# Patient Record
Sex: MALE | Race: WHITE | NOT HISPANIC OR LATINO | Employment: FULL TIME | URBAN - METROPOLITAN AREA
[De-identification: names, ages, dates, MRNs, and addresses within clinical notes are randomized per-mention and may not be internally consistent; named-entity substitution may affect disease eponyms.]

---

## 2020-09-18 ENCOUNTER — TELEPHONE (OUTPATIENT)
Dept: NEUROLOGY | Facility: HOSPITAL | Age: 65
End: 2020-09-18

## 2020-09-21 ENCOUNTER — TELEPHONE (OUTPATIENT)
Dept: NEUROLOGY | Facility: HOSPITAL | Age: 65
End: 2020-09-21

## 2020-09-21 NOTE — TELEPHONE ENCOUNTER
Audio visit scheduled with pt on Wednesday, September 23, 2020 at 215pm.  Pt repeated date and time correctly.

## 2020-09-21 NOTE — TELEPHONE ENCOUNTER
----- Message from Giovana Romo sent at 9/21/2020  9:19 AM CDT -----  Contact: self [phone]  GI - Patient is returning your call. Please call

## 2020-09-23 ENCOUNTER — OFFICE VISIT (OUTPATIENT)
Dept: NEUROLOGY | Facility: HOSPITAL | Age: 65
End: 2020-09-23
Attending: INTERNAL MEDICINE
Payer: COMMERCIAL

## 2020-09-23 DIAGNOSIS — Z18.9 RETAINED FOREIGN BODY: Primary | ICD-10-CM

## 2020-09-23 RX ORDER — SODIUM, POTASSIUM,MAG SULFATES 17.5-3.13G
1 SOLUTION, RECONSTITUTED, ORAL ORAL ONCE
Qty: 1 BOTTLE | Refills: 0 | Status: SHIPPED | OUTPATIENT
Start: 2020-09-23 | End: 2020-09-23

## 2020-09-23 NOTE — PROGRESS NOTES
Established Patient - Audio Only Telehealth Visit     The patient location is: home  The chief complaint leading to consultation is: anemia  Visit type: Virtual visit with audio only (telephone)  Total time spent with patient: 30min    The reason for the audio only service rather than synchronous audio and video virtual visit was related to technical difficulties or patient preference/necessity.     Each patient to whom I provide medical services by telemedicine is:  (1) informed of the relationship between the physician and patient and the respective role of any other health care provider with respect to management of the patient; and (2) notified that they may decline to receive medical services by telemedicine and may withdraw from such care at any time. Patient verbally consented to receive this service via voice-only telephone call.    [facility]    CC: anemia     HPI 65 y.o. male evaluated via telemedicine for new, severe, worsening, iron deficiency anemia who also has a retained Capsocam in the terminal ileum on CT. He is a pt of Dr. Shipley. He developed new anemia in the last year that has progressively worsened, requiring hospitalization this summer due to Hgb 7, was transfused then for the first time in his life. For unclear reasons he had a VCE 3yrs ago which showed an ileal ulcer, he was told to stop NSAIDs which he did, however he notes new worsening anemia in the last year. Had EGD/colonoscopy which were unremarkable so underwent Capsocam in early July. He did not pass the capsule on serial imaging, and had a CT last week that showed retained device in terminal ileum. He has mild worsening post-prandial abdominal pain without nausea or vomiting. No change in bowel habits, he takes Colace daily to have a soft brown BM, no diarrhea, hematochezia. No fevers, chills, weight loss, dysphagia, odynophagia, rectal pain. No oral ulcers, eye problems, joint pain or skin problems. He does chronically  use NSAIDs. No nosebleeds, abdominal XRT, or blood thinners.    Chart reviewed and summarized here.    Past Medical History MARYBEL, HTN, HLD    Review of Systems  General ROS: negative for chills, fever or weight loss  Cardiovascular ROS: no chest pain or dyspnea on exertion  Gastrointestinal ROS: +abdominal pain, +constipation, no black/ bloody stools    Physical Examination limited 2/2 telemedicine however pt alert, cooperative, in no distress, no slurred speech, no audible wheezing or cough, no tangential thoughts    Labs: personally reviewed Hgb 7, ferritin 12    Imaging: CT A/P with retained capsule terminal ileum    Previous medical records reviewed     Assessment:   64yo M with new MARYBEL who has Capsocam retention in the terminal ileum. Reportedly had ileal ulceration on Capsocam 3 years ago, was told to stop chronic NSAIDs however has started up using them again. Ddx stricture from ileal Crohn's vs NSAID enteropathy vs malignancy.    Plan:  - Lower DBE for capsule retrieval and evaluation of suspected ileal stenosis, please give device back to pt so it can be mailed for review of proximal small bowel  - Suprep sent to [facility] in [place]    Pt of Dr Quinten Sharpe MD   [address] [place] [address], [address]   [place] [address] [phone]     This service was not originating from a related E/M service provided within the previous 7 days nor will  to an E/M service or procedure within the next 24 hours or my soonest available appointment.  Prevailing standard of care was able to be met in this audio-only visit.

## 2020-09-24 ENCOUNTER — TELEPHONE (OUTPATIENT)
Dept: NEUROLOGY | Facility: HOSPITAL | Age: 65
End: 2020-09-24

## 2020-09-24 NOTE — TELEPHONE ENCOUNTER
Suprep instructions with pt and mailed to residence.  Confirmed pt's address. Pt informed Endoscopy staff will contact 2-3 days to confirm arrival time. Pt informed covid 19 test to be given the morning of procedure. iSUPREP Instructions     [facility] [address]  Clinic Office [phone]  Endoscopy Lab [phone]     You are scheduled for a Lower DBE with  on Thursday, November 5, 2020 at [facility].  You will check in at the Information desk on the first floor of the hospital. Please contact the office to reschedule if needed.     · A responsible adult (family member or friend) must come with you and transport you home.  You are not allowed to drive, take a taxi/ride share or bus or leave the Endoscopy Center alone.  If you do not have a responsible adult with you to take you home, your exam will be cancelled.      · Please follow instructions of  if you are taking anticoagulant/blood thinning medications such as Aggrenox, Brilinta, Effient, Eliquis, Lovenox, Plavix, Pletal, Pradaxa, Ticilid, Xarelto or Coumadin.      · Please skip your morning dose of insulin or other oral medications for diabetes the morning of the procedure unless instructed otherwise by your doctor. You should take your blood pressure, heart, anti-rejection and or seizure medication the morning of your procedure.     · To ensure that your test is accurate and complete, you must follow these instructions - please do not use the instructions provided from the pharmacy.        The Day Before The Procedure:     · Follow a CLEAR LIQUID DIET for the entire day before your scheduled colonoscopy.  This means no solid food the entire day.  · A clear liquid diet includes the following:  ? Water, Coffee or decaffeinated coffee (no milk or cream)  ? Tea, Herbal tea  ? Carbonated beverages (soft drinks), regular and sugar free  ? Gelatin  ? Apple Juice, white grape juice, white cranberry  juice  ? Gatorade, Power Aid, Crystal Light, Roland Aid (Yellow, Green, Clear)  ? Lemonade and Limeade  ? Bouillon, clear consomme'  ? Snowball, popsicles  (Yellow, Green, Clear)     DO NOT DRINK ANY LIQUIDS CONTAINING RED DYE  DO NOT DRINK ANY LIQUIDS NOT SPECIFICALLY LISTED  DO NOT DRINK ALCOHOL     · At 5 pm the evening before your colonoscopy:  ? Pour one (1) bottle of SUPREP into the container provided in the box. Add water to the line on the container and mix well.  Drink the whole container and then drink 2 more containers of water over 1 hour.  § This is sometimes easier to drink if this solution is cold, so you can mix the solution 20 minutes ahead of time and place in the refrigerator prior to drinking.  You have to drink the solution within 30-45 minutes of mixing it.  Do NOT put this solution over ice.  It IS ok to drink with a straw.     The Day of the Procedure - The Endoscopy department will call you 2 days prior to your procedure to give you the exact time     · 5 hours prior to your ARRIVAL TIME (this may be in the middle of the night)  ? Pour the 2nd bottle of SUPREP into the container provided in the box.  Add water to the line on the container and mix well.  Drink the whole container and then drink 2 more containers of water over 1 hour.     ? AFTER YOU HAVE COMPLETED YOUR PREP, YOU MAY HAVE NOTHING ELSE BY MOUTH     ? Please leave all valuables and jewelry at home.     ? At 600 am, you may take the last dose of any medications you are allowed to take with a small sip of water (blood pressure, heart, anti-rejection and or seizure medication).  If you use inhalers bring them with you.     ? You may call the Endoscopy department at [phone] with any questions regarding your procedure.

## 2020-09-24 NOTE — PATIENT INSTRUCTIONS
SUPREP Instructions    [facility] [address]  Clinic Office [phone]  Endoscopy Lab [phone]    You are scheduled for a Colonoscopy with  on Thursday, November 5, 2020 at [facility].  You will check in at the Information desk on the first floor of the hospital. Please contact the office to reschedule if needed.     A responsible adult (family member or friend) must come with you and transport you home.  You are not allowed to drive, take a taxi/ride share or bus or leave the Endoscopy Center alone.  If you do not have a responsible adult with you to take you home, your exam will be cancelled.      Please follow instructions of  if you are taking anticoagulant/blood thinning medications such as Aggrenox, Brilinta, Effient, Eliquis, Lovenox, Plavix, Pletal, Pradaxa, Ticilid, Xarelto or Coumadin.      Please skip your morning dose of insulin or other oral medications for diabetes the morning of the procedure unless instructed otherwise by your doctor. You should take your blood pressure, heart, anti-rejection and or seizure medication the morning of your procedure.     To ensure that your test is accurate and complete, you must follow these instructions - please do not use the instructions provided from the pharmacy.      The Day Before The Procedure:     Follow a CLEAR LIQUID DIET for the entire day before your scheduled colonoscopy.  This means no solid food the entire day.   A clear liquid diet includes the following:  o Water, Coffee or decaffeinated coffee (no milk or cream)  o Tea, Herbal tea  o Carbonated beverages (soft drinks), regular and sugar free  o Gelatin  o Apple Juice, white grape juice, white cranberry juice  o Gatorade, Power Aid, Crystal Light, Roland Aid (Yellow, Green, Clear)  o Lemonade and Limeade  o Bouillon, clear consomme'  o Snowball, popsicles  (Yellow, Green, Clear)    DO NOT DRINK ANY LIQUIDS CONTAINING RED DYE  DO  NOT DRINK ANY LIQUIDS NOT SPECIFICALLY LISTED  DO NOT DRINK ALCOHOL     At 5 pm the evening before your colonoscopy:  o Pour one (1) bottle of SUPREP into the container provided in the box. Add water to the line on the container and mix well.  Drink the whole container and then drink 2 more containers of water over 1 hour.  - This is sometimes easier to drink if this solution is cold, so you can mix the solution 20 minutes ahead of time and place in the refrigerator prior to drinking.  You have to drink the solution within 30-45 minutes of mixing it.  Do NOT put this solution over ice.  It IS ok to drink with a straw.    The Day of the Procedure - The Endoscopy department will call you 2 days prior to your procedure to give you the exact time     5 hours prior to your ARRIVAL TIME (this may be in the middle of the night)  o Pour the 2nd bottle of SUPREP into the container provided in the box.  Add water to the line on the container and mix well.  Drink the whole container and then drink 2 more containers of water over 1 hour.    o AFTER YOU HAVE COMPLETED YOUR PREP, YOU MAY HAVE NOTHING ELSE BY MOUTH    o Please leave all valuables and jewelry at home.    o At 600 am, you may take the last dose of any medications you are allowed to take with a small sip of water (blood pressure, heart, anti-rejection and or seizure medication).  If you use inhalers bring them with you.    o You may call the Endoscopy department at [phone] with any questions regarding your procedure.

## 2020-09-24 NOTE — LETTER
Boyd Hackett  1955    Lower DBE scheduled with pt on Thursday, November 5, 2020 at [facility].   You will be contacted 2-3 days prior to procedure by Endoscopy staff to confirm arrival time.  A covid 19 test will be given the morning of procedure.  You will be given anesthesia and will not be able to drive for 12 hours, please bring a .    SUPREP Instructions     [facility]  [address] [facility]  Clinic Office [phone]  Endoscopy Lab [phone]     You are scheduled for a Lower DBE with  on Thursday, November 5, 2020 at [facility].  You will check in at the Information desk on the first floor of the hospital. Please contact the office to reschedule if needed.     · A responsible adult (family member or friend) must come with you and transport you home.  You are not allowed to drive, take a taxi/ride share or bus or leave the Endoscopy Center alone.  If you do not have a responsible adult with you to take you home, your exam will be cancelled.      · Please follow instructions of  if you are taking anticoagulant/blood thinning medications such as Aggrenox, Brilinta, Effient, Eliquis, Lovenox, Plavix, Pletal, Pradaxa, Ticilid, Xarelto or Coumadin.      · Please skip your morning dose of insulin or other oral medications for diabetes the morning of the procedure unless instructed otherwise by your doctor. You should take your blood pressure, heart, anti-rejection and or seizure medication the morning of your procedure.     · To ensure that your test is accurate and complete, you must follow these instructions - please do not use the instructions provided from the pharmacy.        The Day Before The Procedure:     · Follow a CLEAR LIQUID DIET for the entire day before your scheduled colonoscopy.  This means no solid food the entire day.  · A clear liquid diet includes the following:  ? Water, Coffee or decaffeinated coffee (no milk or  cream)  ? Tea, Herbal tea  ? Carbonated beverages (soft drinks), regular and sugar free  ? Gelatin  ? Apple Juice, white grape juice, white cranberry juice  ? Gatorade, Power Aid, Crystal Light, Roland Aid (Yellow, Green, Clear)  ? Lemonade and Limeade  ? Bouillon, clear consomme'  ? Snowball, popsicles  (Yellow, Green, Clear)     DO NOT DRINK ANY LIQUIDS CONTAINING RED DYE  DO NOT DRINK ANY LIQUIDS NOT SPECIFICALLY LISTED  DO NOT DRINK ALCOHOL     · At 5 pm the evening before your colonoscopy:  ? Pour one (1) bottle of SUPREP into the container provided in the box. Add water to the line on the container and mix well.  Drink the whole container and then drink 2 more containers of water over 1 hour.  § This is sometimes easier to drink if this solution is cold, so you can mix the solution 20 minutes ahead of time and place in the refrigerator prior to drinking.  You have to drink the solution within 30-45 minutes of mixing it.  Do NOT put this solution over ice.  It IS ok to drink with a straw.     The Day of the Procedure - The Endoscopy department will call you 2 days prior to your procedure to give you the exact time     · 5 hours prior to your ARRIVAL TIME (this may be in the middle of the night)  ? Pour the 2nd bottle of SUPREP into the container provided in the box.  Add water to the line on the container and mix well.  Drink the whole container and then drink 2 more containers of water over 1 hour.     ? AFTER YOU HAVE COMPLETED YOUR PREP, YOU MAY HAVE NOTHING ELSE BY MOUTH     ? Please leave all valuables and jewelry at home.     ? At 600 am, you may take the last dose of any medications you are allowed to take with a small sip of water (blood pressure, heart, anti-rejection and or seizure medication).  If you use inhalers bring them with you.     ? You may call the Endoscopy department at [phone] with any questions regarding your procedure.

## 2020-10-09 ENCOUNTER — TELEPHONE (OUTPATIENT)
Dept: NEUROLOGY | Facility: HOSPITAL | Age: 65
End: 2020-10-09

## 2020-10-09 NOTE — TELEPHONE ENCOUNTER
Pt requesting information for local hotels for procedure scheduled on 11/2/20. Pt informed local area lodging listing to be emailed.  Pt acknowledged understanding.

## 2020-10-09 NOTE — TELEPHONE ENCOUNTER
----- Message from Odalys Gandara sent at 10/9/2020  9:25 AM CDT -----  Contact: [phone]/Self  GI  Patient is requesting to speak with nurse regarding upcoming procedure. Please advise.

## 2020-11-03 ENCOUNTER — TELEPHONE (OUTPATIENT)
Dept: ENDOSCOPY | Facility: HOSPITAL | Age: 65
End: 2020-11-03

## 2020-11-03 NOTE — TELEPHONE ENCOUNTER
Spoke with patient about arrival time @ 0700.   Covid test = needs rapid    Prep instructions reviewed: the day before the procedure, follow a clear liquid diet all day, then start the first 1/2 of prep at 5pm and take 2nd 1/2 of prep @ 0200.  Pt must be completely NPO when prep completed @ 0400.              Medications: Do not take Insulin or oral diabetic medications the day of the procedure.  Take as prescribed: heart, seizure and blood pressure medication in the morning with a sip of water (less than an ounce).  Take any breathing medications and bring inhalers to hospital with you Leave all valuables and jewelry at home.     Wear comfortable clothes to procedure to change into hospital gown You cannot drive for 24 hours after your procedure because you will receive sedation for your procedure to make you comfortable.  A ride must be provided at discharge.

## 2020-11-05 ENCOUNTER — ANESTHESIA EVENT (OUTPATIENT)
Dept: ENDOSCOPY | Facility: HOSPITAL | Age: 65
End: 2020-11-05
Payer: COMMERCIAL

## 2020-11-05 ENCOUNTER — HOSPITAL ENCOUNTER (OUTPATIENT)
Facility: HOSPITAL | Age: 65
Discharge: HOME | End: 2020-11-05
Attending: INTERNAL MEDICINE | Admitting: INTERNAL MEDICINE
Payer: COMMERCIAL

## 2020-11-05 ENCOUNTER — ANESTHESIA (OUTPATIENT)
Dept: ENDOSCOPY | Facility: HOSPITAL | Age: 65
End: 2020-11-05
Payer: COMMERCIAL

## 2020-11-05 VITALS
DIASTOLIC BLOOD PRESSURE: 84 MMHG | RESPIRATION RATE: 18 BRPM | TEMPERATURE: 98 F | SYSTOLIC BLOOD PRESSURE: 128 MMHG | HEART RATE: 84 BPM | HEIGHT: 67 IN | WEIGHT: 144 LBS | OXYGEN SATURATION: 100 % | BODY MASS INDEX: 22.6 KG/M2

## 2020-11-05 DIAGNOSIS — K56.699 ILEAL STENOSIS: Primary | ICD-10-CM

## 2020-11-05 DIAGNOSIS — K63.3 ULCER OF ILEUM: ICD-10-CM

## 2020-11-05 LAB — SARS-COV-2 RDRP RESP QL NAA+PROBE: NEGATIVE

## 2020-11-05 PROCEDURE — 25000003 PHARM REV CODE 250: Performed by: NURSE ANESTHETIST, CERTIFIED REGISTERED

## 2020-11-05 PROCEDURE — U0002 COVID-19 LAB TEST NON-CDC: HCPCS

## 2020-11-05 PROCEDURE — 45381 COLONOSCOPY SUBMUCOUS NJX: CPT | Performed by: INTERNAL MEDICINE

## 2020-11-05 PROCEDURE — 25000003 PHARM REV CODE 250: Performed by: INTERNAL MEDICINE

## 2020-11-05 PROCEDURE — 88305 TISSUE EXAM BY PATHOLOGIST: CPT | Mod: 26,,, | Performed by: PATHOLOGY

## 2020-11-05 PROCEDURE — 37000008 HC ANESTHESIA 1ST 15 MINUTES: Performed by: INTERNAL MEDICINE

## 2020-11-05 PROCEDURE — 63600175 PHARM REV CODE 636 W HCPCS: Performed by: NURSE ANESTHETIST, CERTIFIED REGISTERED

## 2020-11-05 PROCEDURE — 27201012 HC FORCEPS, HOT/COLD, DISP: Performed by: INTERNAL MEDICINE

## 2020-11-05 PROCEDURE — 37000009 HC ANESTHESIA EA ADD 15 MINS: Performed by: INTERNAL MEDICINE

## 2020-11-05 PROCEDURE — 88305 TISSUE EXAM BY PATHOLOGIST: ICD-10-PCS | Mod: 26,,, | Performed by: PATHOLOGY

## 2020-11-05 PROCEDURE — 45380 COLONOSCOPY AND BIOPSY: CPT | Performed by: INTERNAL MEDICINE

## 2020-11-05 PROCEDURE — 27201238 HC BALLOON, OVERTUBE (ANY): Performed by: INTERNAL MEDICINE

## 2020-11-05 PROCEDURE — 27201028 HC NEEDLE, SCLERO: Performed by: INTERNAL MEDICINE

## 2020-11-05 PROCEDURE — 88305 TISSUE EXAM BY PATHOLOGIST: CPT | Performed by: PATHOLOGY

## 2020-11-05 RX ORDER — LIDOCAINE HCL/PF 100 MG/5ML
SYRINGE (ML) INTRAVENOUS
Status: DISCONTINUED | OUTPATIENT
Start: 2020-11-05 | End: 2020-11-05

## 2020-11-05 RX ORDER — SODIUM CHLORIDE 9 MG/ML
INJECTION, SOLUTION INTRAVENOUS CONTINUOUS
Status: DISCONTINUED | OUTPATIENT
Start: 2020-11-05 | End: 2020-11-05 | Stop reason: HOSPADM

## 2020-11-05 RX ORDER — ZOLPIDEM TARTRATE 10 MG/1
5 TABLET ORAL NIGHTLY PRN
COMMUNITY

## 2020-11-05 RX ORDER — PROPOFOL 10 MG/ML
INJECTION, EMULSION INTRAVENOUS
Status: DISCONTINUED | OUTPATIENT
Start: 2020-11-05 | End: 2020-11-05

## 2020-11-05 RX ORDER — PANTOPRAZOLE SODIUM 40 MG/1
40 TABLET, DELAYED RELEASE ORAL DAILY
COMMUNITY

## 2020-11-05 RX ORDER — MELOXICAM 7.5 MG/1
7.5 TABLET ORAL DAILY
COMMUNITY

## 2020-11-05 RX ORDER — PHENYLEPHRINE HYDROCHLORIDE 10 MG/ML
INJECTION INTRAVENOUS
Status: DISCONTINUED | OUTPATIENT
Start: 2020-11-05 | End: 2020-11-05

## 2020-11-05 RX ORDER — LISINOPRIL AND HYDROCHLOROTHIAZIDE 10; 12.5 MG/1; MG/1
1 TABLET ORAL DAILY
COMMUNITY

## 2020-11-05 RX ORDER — PROPOFOL 10 MG/ML
INJECTION, EMULSION INTRAVENOUS CONTINUOUS PRN
Status: DISCONTINUED | OUTPATIENT
Start: 2020-11-05 | End: 2020-11-05

## 2020-11-05 RX ORDER — PRAVASTATIN SODIUM 40 MG/1
40 TABLET ORAL DAILY
COMMUNITY

## 2020-11-05 RX ORDER — HYDROCODONE BITARTRATE AND ACETAMINOPHEN 7.5; 325 MG/1; MG/1
1 TABLET ORAL EVERY 12 HOURS PRN
COMMUNITY

## 2020-11-05 RX ORDER — SERTRALINE HYDROCHLORIDE 50 MG/1
50 TABLET, FILM COATED ORAL DAILY
COMMUNITY

## 2020-11-05 RX ORDER — METHOCARBAMOL 750 MG/1
750 TABLET, FILM COATED ORAL
COMMUNITY

## 2020-11-05 RX ORDER — SODIUM CHLORIDE 0.9 % (FLUSH) 0.9 %
10 SYRINGE (ML) INJECTION
Status: DISCONTINUED | OUTPATIENT
Start: 2020-11-05 | End: 2020-11-05 | Stop reason: HOSPADM

## 2020-11-05 RX ADMIN — PHENYLEPHRINE HYDROCHLORIDE 100 MCG: 10 INJECTION INTRAVENOUS at 08:11

## 2020-11-05 RX ADMIN — GLYCOPYRROLATE 0.2 MG: 0.2 INJECTION, SOLUTION INTRAMUSCULAR; INTRAVITREAL at 08:11

## 2020-11-05 RX ADMIN — PROPOFOL 80 MG: 10 INJECTION, EMULSION INTRAVENOUS at 08:11

## 2020-11-05 RX ADMIN — LIDOCAINE HYDROCHLORIDE 50 MG: 20 INJECTION, SOLUTION INTRAVENOUS at 08:11

## 2020-11-05 RX ADMIN — SODIUM CHLORIDE: 0.9 INJECTION, SOLUTION INTRAVENOUS at 08:11

## 2020-11-05 RX ADMIN — PROPOFOL 150 MCG/KG/MIN: 10 INJECTION, EMULSION INTRAVENOUS at 08:11

## 2020-11-05 NOTE — H&P
[facility]    CC: anemia     HPI 65 y.o. male evaluated via telemedicine for new, severe, worsening, iron deficiency anemia who also has a retained Capsocam in the terminal ileum on CT. He is a pt of Dr. Shipley. He developed new anemia in the last year that has progressively worsened, requiring hospitalization this summer due to Hgb 7, was transfused then for the first time in his life. For unclear reasons he had a VCE 3yrs ago which showed an ileal ulcer, he was told to stop NSAIDs which he did, however he notes new worsening anemia in the last year. Had EGD/colonoscopy which were unremarkable so underwent Capsocam in early July. He did not pass the capsule on serial imaging, and had a CT last week that showed retained device in terminal ileum. He has mild worsening post-prandial abdominal pain without nausea or vomiting. No change in bowel habits, he takes Colace daily to have a soft brown BM, no diarrhea, hematochezia. No fevers, chills, weight loss, dysphagia, odynophagia, rectal pain. No oral ulcers, eye problems, joint pain or skin problems. He does chronically use NSAIDs. No nosebleeds, abdominal XRT, or blood thinners.    Chart reviewed and summarized here.    Past Medical History MARYBEL, HTN, HLD    Review of Systems  General ROS: negative for chills, fever or weight loss  Cardiovascular ROS: no chest pain or dyspnea on exertion  Gastrointestinal ROS: +abdominal pain, +constipation, no black/ bloody stools    Physical Examination   Gen: alert, cooperative, in no distress, no slurred speech  Resp: no audible wheezing or cough  Psych: no tangential thoughts    Labs: personally reviewed Hgb 7, ferritin 12    Imaging: CT A/P with retained capsule terminal ileum    Previous medical records reviewed     Assessment:   64yo M with new MARYBEL who has Capsocam retention in the terminal ileum. Reportedly had ileal ulceration on Capsocam 3 years ago, was told to stop chronic NSAIDs however has started up using them  again. Ddx stricture from ileal Crohn's vs NSAID enteropathy vs malignancy.    Plan:  - Lower DBE for capsule retrieval and evaluation of suspected ileal stenosis today      Pt of Dr Shipley

## 2020-11-05 NOTE — ANESTHESIA POSTPROCEDURE EVALUATION
Anesthesia Post Evaluation    Patient: Boyd Hackett Jr.    Procedure(s) Performed: Procedure(s) (LRB):  lower DBE with capsule retrieval (N/A)    Final Anesthesia Type: MAC    Patient location during evaluation: GI PACU  Patient participation: Yes- Able to Participate  Level of consciousness: awake and alert and oriented  Post-procedure vital signs: reviewed and stable  Pain management: adequate  Airway patency: patent    PONV status at discharge: No PONV  Anesthetic complications: no      Cardiovascular status: blood pressure returned to baseline, hemodynamically stable and stable  Respiratory status: unassisted, spontaneous ventilation and room air  Hydration status: euvolemic  Follow-up not needed.          Vitals Value Taken Time   BP  11/05/20 0920   Temp  11/05/20 0920   Pulse  11/05/20 0920   Resp  11/05/20 0920   SpO2  11/05/20 0920         No case tracking events are documented in the log.      Pain/Casi Score: Casi Score: 10 (11/5/2020  9:15 AM)

## 2020-11-05 NOTE — PLAN OF CARE
Pt AAO, ambulatory. Discharge instructions given to pt, verbalized understanding.  Pt seen by MD at bedside.  Escorted out with staff member and discharged with family.    Awaiting report to print and for Dr. Sharpe to speak with him.

## 2020-11-05 NOTE — ANESTHESIA PREPROCEDURE EVALUATION
11/05/2020  Boyd Hackett Jr. is a 65 y.o., male scheduled for DBE with capsule retrieval.     Anesthesia Evaluation    I have reviewed the Patient Summary Reports.    I have reviewed the Nursing Notes.       Review of Systems  Anesthesia Hx:  No problems with previous Anesthesia  History of prior surgery of interest to airway management or planning: Denies Family Hx of Anesthesia complications.   Denies Personal Hx of Anesthesia complications.   Hematology/Oncology:  Hematology Normal   Oncology Normal     Cardiovascular:  Cardiovascular Normal     Pulmonary:  Pulmonary Normal    Renal/:  Renal/ Normal     Musculoskeletal:  Musculoskeletal Normal    Neurological:  Neurology Normal    Psych:  Psychiatric Normal           Physical Exam  General:  Well nourished    Airway/Jaw/Neck:  Airway Findings: Mouth Opening: Normal Tongue: Normal  General Airway Assessment: Adult  Oropharynx Findings: Normal Mallampati: III  Improves to II with phonation.  TM Distance: Normal, at least 6 cm        Eyes/Ears/Nose:  EYES/EARS/NOSE FINDINGS: Normal   Dental:  DENTAL FINDINGS: Normal   Chest/Lungs:  Chest/Lungs Clear    Heart/Vascular:  Heart Findings: Normal       Mental Status:  Mental Status Findings: Normal        Anesthesia Plan  Type of Anesthesia, risks & benefits discussed:  Anesthesia Type:  general, MAC  Patient's Preference:   Intra-op Monitoring Plan: standard ASA monitors  Intra-op Monitoring Plan Comments:   Post Op Pain Control Plan:   Post Op Pain Control Plan Comments:   Induction:   IV  Beta Blocker:  Patient is not currently on a Beta-Blocker (No further documentation required).       Informed Consent: Patient understands risks and agrees with Anesthesia plan.  Questions answered. Anesthesia consent signed with patient.  ASA Score: 2     Day of Surgery Review of History & Physical: I have  interviewed and examined the patient. I have reviewed the patient's H&P dated:            Ready For Surgery From Anesthesia Perspective.

## 2020-11-05 NOTE — TRANSFER OF CARE
"Anesthesia Transfer of Care Note    Patient: Boyd Hackett Jr.    Procedure(s) Performed: Procedure(s) (LRB):  lower DBE with capsule retrieval (N/A)    Patient location: GI    Anesthesia Type: MAC    Transport from OR: Transported from OR on room air with adequate spontaneous ventilation    Post pain: adequate analgesia    Post assessment: no apparent anesthetic complications and tolerated procedure well    Post vital signs: stable    Level of consciousness: awake, alert and oriented    Nausea/Vomiting: no nausea/vomiting    Complications: none    Transfer of care protocol was followed      Last vitals:   Visit Vitals  /74 (BP Location: Left arm, Patient Position: Lying)   Pulse 75   Temp 36.7 °C (98.1 °F)   Resp 16   Ht 5' 7" (1.702 m)   Wt 65.3 kg (144 lb)   SpO2 99%   BMI 22.55 kg/m²     "

## 2020-11-05 NOTE — [employer] PATIENT INSTRUCTIONS
Discharge Summary/Instructions after an Endoscopic Procedure  Patient Name: Boyd Hackett  Patient MRN: [id number]  Patient YOB: 1955  Thursday, November 5, 2020  Hakeem Sharpe MD  Your health is very important to us during the Covid Crisis. Following your   procedure today, you will receive a daily text for 2 weeks asking about   signs or symptoms of Covid 19.  Please respond to this text when you   receive it so we can follow up and keep you as safe as possible.   RESTRICTIONS:  During your procedure today, you received medications for sedation.  These   medications may affect your judgment, balance and coordination.  Therefore,   for 24 hours, you have the following restrictions:   - DO NOT drive a car, operate machinery, make legal/financial decisions,   sign important papers or drink alcohol.    ACTIVITY:  Today: no heavy lifting, straining or running due to procedural   sedation/anesthesia.  The following day: return to full activity including work.  DIET:  Eat and drink normally unless instructed otherwise.     TREATMENT FOR COMMON SIDE EFFECTS:  - Mild abdominal pain, nausea, belching, bloating or excessive gas:  rest,   eat lightly and use a heating pad.  - Sore Throat: treat with throat lozenges and/or gargle with warm salt   water.  - Because air was used during the procedure, expelling large amounts of air   from your rectum or belching is normal.  - If a bowel prep was taken, you may not have a bowel movement for 1-3 days.    This is normal.  SYMPTOMS TO WATCH FOR AND REPORT TO YOUR PHYSICIAN:  1. Abdominal pain or bloating, other than gas cramps.  2. Chest pain.  3. Back pain.  4. Signs of infection such as: chills or fever occurring within 24 hours   after the procedure.  5. Rectal bleeding, which would show as bright red, maroon, or black stools.   (A tablespoon of blood from the rectum is not serious, especially if   hemorrhoids are present.)  6. Vomiting.  7. Weakness or dizziness.  GO  DIRECTLY TO THE NEAREST EMERGENCY ROOM IF YOU HAVE ANY OF THE FOLLOWING:      Difficulty breathing              Chills and/or fever over 101 F   Persistent vomiting and/or vomiting blood   Severe abdominal pain   Severe chest pain   Black, tarry stools   Bleeding- more than one tablespoon   Any other symptom or condition that you feel may need urgent attention  Your doctor recommends these additional instructions:  If any biopsies were taken, your doctors clinic will contact you in 1 to 2   weeks with any results.  Plan for laparoscopic small bowel resection of the observed stenosis for   definitive treatment as well as to exclude a malignant process. The small   bowel proximal to this stenosis should be examined for evidence of   additional diaphragm lesions which may be multiple in cases of NSAID   enteropathy. The surgeon should focus on the 50cm of small bowel proximal   to the Ink tattoo to look for additional stenoses.   Discharge to home   Condition stable   Resume previous diet   The signs and symptoms of potential delayed complications were discussed   with the patient. If signs or symptoms of these complications develop, call   the [facility] at [phone].   Return to normal activities tomorrow.  Written discharge instructions were   provided to the patient.  For questions, problems or results please call your physician - Hakeem Sharpe MD.  EMERGENCY PHONE NUMBER: [phone],  LAB RESULTS: [phone]  IF A COMPLICATION OR EMERGENCY SITUATION ARISES AND YOU ARE UNABLE TO REACH   YOUR PHYSICIAN - GO DIRECTLY TO THE EMERGENCY ROOM.  MD Hakeem Devries MD  11/5/2020 10:13:09 AM  This report has been verified and signed electronically.  [employer]

## 2020-11-06 LAB
FINAL PATHOLOGIC DIAGNOSIS: NORMAL
GROSS: NORMAL
Lab: NORMAL

## 2020-11-09 ENCOUNTER — TELEPHONE (OUTPATIENT)
Dept: NEUROLOGY | Facility: HOSPITAL | Age: 65
End: 2020-11-09

## 2020-11-09 NOTE — TELEPHONE ENCOUNTER
Called patient to review pathology which returned as unrevealing. Recommended proceeding to surgery. He verbalized understanding.

## 2020-11-18 ENCOUNTER — NURSE TRIAGE (OUTPATIENT)
Dept: ADMINISTRATIVE | Facility: CLINIC | Age: 65
End: 2020-11-18

## 2020-11-18 NOTE — TELEPHONE ENCOUNTER
Contacted pt on behalf of Post [facility]ural [facility]. Pt verified by first and last name and . Pt denies any fever, cough, or difficulty breathing since procedure. Advised pt if these symptoms do arise to contact [facility] or PCP. No follow up needed.

## 2022-11-23 ENCOUNTER — PATIENT MESSAGE (OUTPATIENT)
Dept: NEUROLOGY | Facility: HOSPITAL | Age: 67
End: 2022-11-23
Payer: COMMERCIAL

## 2022-11-23 ENCOUNTER — TELEPHONE (OUTPATIENT)
Dept: NEUROLOGY | Facility: HOSPITAL | Age: 67
End: 2022-11-23
Payer: COMMERCIAL

## 2022-11-23 NOTE — TELEPHONE ENCOUNTER
----- Message from Dianelys Wehat sent at 11/23/2022 10:27 AM CST -----  Regarding: call back  Contact: [phone]  Type:  Patient Returning Call    Who Called: PT   Who Left Message for Patient: Nurse   Does the patient know what this is regarding?: Yes   Would the patient rather a call back or a response via [facility]? Call back   Best Call Back Number: [phone]  Additional Information:

## 2022-11-23 NOTE — TELEPHONE ENCOUNTER
Attempt to schedule appt from referral by Dr. Rigoberto Marc.  Messages placed on voicemail and pt portal.

## 2022-11-30 ENCOUNTER — OFFICE VISIT (OUTPATIENT)
Dept: NEUROLOGY | Facility: HOSPITAL | Age: 67
End: 2022-11-30
Attending: INTERNAL MEDICINE
Payer: COMMERCIAL

## 2022-11-30 VITALS
SYSTOLIC BLOOD PRESSURE: 109 MMHG | HEART RATE: 90 BPM | BODY MASS INDEX: 22.72 KG/M2 | DIASTOLIC BLOOD PRESSURE: 69 MMHG | WEIGHT: 145.06 LBS

## 2022-11-30 DIAGNOSIS — D50.0 ANEMIA DUE TO CHRONIC BLOOD LOSS: Primary | ICD-10-CM

## 2022-11-30 PROCEDURE — 99213 OFFICE O/P EST LOW 20 MIN: CPT | Performed by: INTERNAL MEDICINE

## 2022-11-30 RX ORDER — MISOPROSTOL 100 UG/1
100 TABLET ORAL
Qty: 448 TABLET | Refills: 0 | Status: SHIPPED | OUTPATIENT
Start: 2022-11-30 | End: 2023-03-22

## 2022-11-30 NOTE — PROGRESS NOTES
[facility]    CC: ileal stenosis, MARYBEL    HPI 67 y.o. 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 Had VCE retention 2020 with subsequent lower DBE. Lower DBE revealed distal ileum contained a benign-appearing, intrinsic severe stenosis ~100cm proximal to the IC valve (biopsies revealed normal tissue) and unable to retrieve VCE due to stenosis (tattooed). Underwent surgical resection of stenosis (path with benign tissue) and VCE removal 2020.    Attempted to give IV iron but insurance denied. Only taking daily PO iron.    Still taking daily meloxicam but stopped 1 month ago.    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 Surgery to resect the ileal NSAID enteropathy was successful but path results failed to demonstrate the diaphragm lesion(s) which is not unusual unless the pathologist sections and examines the specimen with a specific goal of evaluating for NSAID enteropathy. This patient's anemia has recurred in the setting of restarting NSAID therapy. 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       Plan:  -will give 8 weeks of misoprostol 200 mg QID to promote healing of NSAID enteropathy  -complete cessation of NSAIDS, including mobic  -continue daily PO iron and parenteral iron as needed      Hakeem Sharpe MD   [address], [address]   [place]  [address]   [phone]